# Patient Record
Sex: MALE | Race: BLACK OR AFRICAN AMERICAN | NOT HISPANIC OR LATINO | Employment: UNEMPLOYED | URBAN - METROPOLITAN AREA
[De-identification: names, ages, dates, MRNs, and addresses within clinical notes are randomized per-mention and may not be internally consistent; named-entity substitution may affect disease eponyms.]

---

## 2022-05-07 ENCOUNTER — HOSPITAL ENCOUNTER (EMERGENCY)
Facility: HOSPITAL | Age: 22
Discharge: HOME | End: 2022-05-07
Attending: FAMILY MEDICINE
Payer: COMMERCIAL

## 2022-05-07 VITALS
HEART RATE: 64 BPM | HEIGHT: 63 IN | TEMPERATURE: 99 F | SYSTOLIC BLOOD PRESSURE: 125 MMHG | RESPIRATION RATE: 16 BRPM | DIASTOLIC BLOOD PRESSURE: 77 MMHG | BODY MASS INDEX: 21.09 KG/M2 | WEIGHT: 119.06 LBS | OXYGEN SATURATION: 99 %

## 2022-05-07 DIAGNOSIS — Z13.9 ENCOUNTER FOR MEDICAL SCREENING EXAMINATION: Primary | ICD-10-CM

## 2022-05-07 PROCEDURE — 99282 EMERGENCY DEPT VISIT SF MDM: CPT

## 2022-05-08 NOTE — ED PROVIDER NOTES
"Encounter Date: 5/7/2022       History     Chief Complaint   Patient presents with    Medical Screen     Pt brought in from [facility] after eating liquid deodorant. He reports he wanted to get high. GCS 15.      Patient is a 21 year old male, brought to ED from [facility] after eating "liquid deodorant" because he claims wanted to get high about 2 hours prior to arrival.  He is without ocmplaints.  Patient's vitals are stable, he is resting in hospital bed.     The history is provided by the patient and the police.   Illness   The current episode started 1 to 2 hours ago. Pertinent negatives include no fever, no abdominal pain, no constipation, no diarrhea, no nausea, no vomiting, no headaches, no muscle aches, no neck pain, no cough, no shortness of breath, no URI and no rash.     Review of patient's allergies indicates:  No Known Allergies  History reviewed. No pertinent past medical history.  History reviewed. No pertinent surgical history.  History reviewed. No pertinent family history.  Social History     Tobacco Use    Smoking status: Former Smoker     Types: Cigarettes    Smokeless tobacco: Never Used   Substance Use Topics    Alcohol use: Not Currently    Drug use: Not Currently     Review of Systems   Constitutional: Negative.  Negative for chills and fever.   HENT: Negative for facial swelling and trouble swallowing.    Eyes: Negative.    Respiratory: Negative for cough, chest tightness and shortness of breath.    Cardiovascular: Negative for chest pain and leg swelling.   Gastrointestinal: Negative for abdominal pain, constipation, diarrhea, nausea and vomiting.   Genitourinary: Negative.    Musculoskeletal: Negative for arthralgias and neck pain.   Skin: Negative for color change and rash.   Neurological: Negative for dizziness, tremors, speech difficulty, light-headedness, numbness and headaches.       Physical Exam     Initial Vitals [05/07/22 2133]   BP Pulse Resp Temp SpO2 "   (!) 151/91 72 17 98.8 °F (37.1 °C) 95 %      MAP       --         Physical Exam    Nursing note and vitals reviewed.  Constitutional: He appears well-developed and well-nourished.   HENT:   Nose: Nose normal.   Eyes: Conjunctivae are normal. Pupils are equal, round, and reactive to light.   Neck:   Normal range of motion.  Cardiovascular: Normal rate, regular rhythm and intact distal pulses.   Pulmonary/Chest: Breath sounds normal.   Abdominal: Abdomen is soft. Bowel sounds are normal. There is no abdominal tenderness. There is no rebound and no guarding.   Musculoskeletal:         General: Normal range of motion.      Cervical back: Normal range of motion.     Neurological: He is alert and oriented to person, place, and time. He has normal strength.   Skin: Skin is warm. Capillary refill takes less than 2 seconds.         ED Course   Procedures  Labs Reviewed - No data to display       Imaging Results    None          Medications - No data to display  Medical Decision Making:   ED Management:  Patient was held for observation for 1 hour and 30 mins.   During this time, patient's vitals were stable.   He had no episodes of nausea, vomiting.  Patient is without complaints.   Discussed patient and plan with Dr. Bagley.    Other:   I have discussed this case with another health care provider.       <> Summary of the Discussion: Physician recommended hold patient for brief observation prior to discharge.                        Clinical Impression:   Final diagnoses:  [Z13.9] Encounter for medical screening examination (Primary)          ED Disposition Condition    Discharge Stable        ED Prescriptions     None        Follow-up Information     Follow up With Specialties Details Why Contact Info    [facility] Emergency Medicine  As needed, If symptoms worsen [address] [facility] [address] [phone]           MIREYA Pearce  05/08/22 6708